# Patient Record
(demographics unavailable — no encounter records)

---

## 2024-10-31 NOTE — PHYSICAL EXAM
[Chaperone Present] : A chaperone was present in the examining room during all aspects of the physical examination [[id number]] : A chaperone was present during the pelvic exam. [Appropriately responsive] : appropriately responsive [Alert] : alert [No Acute Distress] : no acute distress [No Lymphadenopathy] : no lymphadenopathy [Regular Rate Rhythm] : regular rate rhythm [No Murmurs] : no murmurs [Clear to Auscultation B/L] : clear to auscultation bilaterally [Soft] : soft [Non-tender] : non-tender [Non-distended] : non-distended [No HSM] : No HSM [No Lesions] : no lesions [No Mass] : no mass [Oriented x3] : oriented x3 [Examination Of The Breasts] : a normal appearance [No Masses] : no breast masses were palpable [Labia Majora] : normal [Labia Minora] : normal [Normal] : normal [Uterine Adnexae] : normal

## 2024-10-31 NOTE — HISTORY OF PRESENT ILLNESS
[FreeTextEntry1] : 37yo  LMP 8/29 here for annual and +UPT. no compalints.  pmh denies psh denies meds none all nkda  obhx c/s @ 33wks IUGR NRFHT, TOLAC x 1 3lbs, c/s x 2 @ 28wks classical for IUGR and abruption, c/s 36-37wks on lovenox and aspirin gynhx unremarkable

## 2024-10-31 NOTE — PROCEDURE
[Cervical Pap Smear] : cervical Pap smear [Liquid Base] : liquid base [GC & Chlamydia via Pap] : GC & Chlamydia via Pap [Tolerated Well] : the patient tolerated the procedure well [No Complications] : there were no complications [Transvaginal OB Sonogram] : Transvaginal OB Sonogram [Intrauterine Pregnancy] : intrauterine pregnancy [Yolk Sac] : yolk sac present [Fetal Heart] : fetal heart present [Date: ___] : Date: [unfilled] [Current GA by Sonogram: ___ (wks)] : Current GA by Sonogram: [unfilled]Uwks [Transvaginal OB Sonogram WNL] : Transvaginal OB Sonogram WNL [FreeTextEntry1] : CRL c/w dating, no ff, no masses

## 2024-11-05 NOTE — HISTORY OF PRESENT ILLNESS
[FreeTextEntry1] : pt has a hx of c/s and the youngest is 2 years old she used bcand stopped about a year ago , she was having reg cycles her lmp 8/2/2024 went to the [facility] 8/[facility]/2024 then bled agaIN ON 8/29 went to the [facility] again on 9/11/2024  based on the initial sono the size didnt equal dates  the edc given by initial sono  to confirm now since edc imp in relation to picking OR date on the first visit the pt had a +urine c/s for e.coli and enter. fecalis called re rx pt not symptomatic discussed with pt even though not symptomatic she redid the urine c/s today will start rx with macrobid based on the urine c/s from 10/31/2024 discussed bladder hygiene to continue rx even if urine c/s neg to do intermittent urine c/s thru the preg  pt is transferring to dr faye

## 2024-11-05 NOTE — PLAN
[FreeTextEntry1] : her youngest is 2 years old pt was using bc, stopped about 1 year ago , reg cycles monthly  she had a period on 8/2/2024 and went to the [facility] on 8/20/2024 she bled again on 8/29/2024  then went to the [facility] on 9/11/2024   as above in the narrative